# Patient Record
Sex: FEMALE | Race: WHITE | NOT HISPANIC OR LATINO | Employment: OTHER | URBAN - NONMETROPOLITAN AREA
[De-identification: names, ages, dates, MRNs, and addresses within clinical notes are randomized per-mention and may not be internally consistent; named-entity substitution may affect disease eponyms.]

---

## 2021-12-29 ENCOUNTER — APPOINTMENT (OUTPATIENT)
Dept: LAB | Facility: HOSPITAL | Age: 76
End: 2021-12-29
Attending: OTOLARYNGOLOGY
Payer: COMMERCIAL

## 2021-12-29 DIAGNOSIS — R94.4 NONSPECIFIC ABNORMAL RESULTS OF KIDNEY FUNCTION STUDY: ICD-10-CM

## 2021-12-29 LAB
BUN SERPL-MCNC: 16 MG/DL (ref 5–25)
CREAT SERPL-MCNC: 0.79 MG/DL (ref 0.6–1.3)
GFR SERPL CREATININE-BSD FRML MDRD: 73 ML/MIN/1.73SQ M

## 2021-12-29 PROCEDURE — 36415 COLL VENOUS BLD VENIPUNCTURE: CPT

## 2021-12-29 PROCEDURE — 82565 ASSAY OF CREATININE: CPT

## 2021-12-29 PROCEDURE — 84520 ASSAY OF UREA NITROGEN: CPT

## 2022-01-11 ENCOUNTER — HOSPITAL ENCOUNTER (OUTPATIENT)
Dept: MRI IMAGING | Facility: HOSPITAL | Age: 77
Discharge: HOME | End: 2022-01-11
Attending: OTOLARYNGOLOGY
Payer: COMMERCIAL

## 2022-01-11 DIAGNOSIS — H90.3 SENSORINEURAL HEARING LOSS, BILATERAL: ICD-10-CM

## 2022-01-11 PROCEDURE — G1004 CDSM NDSC: HCPCS

## 2022-01-11 PROCEDURE — 70551 MRI BRAIN STEM W/O DYE: CPT

## 2023-05-24 ENCOUNTER — OFFICE VISIT (OUTPATIENT)
Age: 78
End: 2023-05-24

## 2023-05-24 VITALS
DIASTOLIC BLOOD PRESSURE: 80 MMHG | SYSTOLIC BLOOD PRESSURE: 120 MMHG | HEIGHT: 64 IN | BODY MASS INDEX: 23.9 KG/M2 | RESPIRATION RATE: 18 BRPM | HEART RATE: 97 BPM | OXYGEN SATURATION: 98 % | WEIGHT: 140 LBS | TEMPERATURE: 97 F

## 2023-05-24 DIAGNOSIS — L03.114 CELLULITIS OF LEFT UPPER EXTREMITY: ICD-10-CM
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
## 2023-05-24 RX ORDER — AMOXICILLIN AND CLAVULANATE POTASSIUM 875; 125 MG/1; MG/1
1 TABLET, FILM COATED ORAL EVERY 12 HOURS SCHEDULED
Qty: 14 TABLET | Refills: 0 | Status: SHIPPED | OUTPATIENT
Start: 2023-05-24 | End: 2023-05-31

## 2023-05-24 NOTE — PROGRESS NOTES
[facility]        NAME: Krishna Cast is a 78 y o  female  : 1945    MRN: [id number]  DATE: May 24, 2023  TIME: 9:22 AM    Assessment and Plan   Cat bite, initial encounter [[id number]]  1  Cat bite, initial encounter  amoxicillin-clavulanate (AUGMENTIN) 875-125 mg per tablet      2  Cellulitis of left upper extremity  amoxicillin-clavulanate (AUGMENTIN) 875-125 mg per tablet          Information from cat received from [employer], faxed to animal control by nursing staff  Discussed with patient regarding CDC recommendation  States cat was on the way to the shelter- unable to watch for 10 days  States she does not want vaccines  States understanding of recommendation  Patient declined Tdap vaccine (unknown last Tetanus vaccination)  Patient had requested and provided with a sling due to increased pain when she lets her left hand down  Patient Instructions   Start and complete course of antibiotics  If left hand redness and swelling worse after 24 hours of taking antibiotics - go to the ED as this can represent worsening of the cellulitis and/or further complications that could possibly require interventions only available in the hospital     CDC recommends watch the animal for 10 days to monitor for signs and symptoms of rabies  Follow up with PCP in 3-5 days for wound check  Proceed to ER if symptoms worsen or if you decide you want to receive vaccination    Chief Complaint     Chief Complaint   Patient presents with   • Animal Bite     Sisters Cat bit patient in right forearm, left forearm and left thumb yesterday  Left thumb is warm to the touch, red and swollen          History of Present Illness       Bite by sisters cat yesterday  Has redness and swelling to about 33% of left hand around left thumb  It is warm to touch and painful  She states that they were taking the cat to the shelter as her sister was unable to care for the cat anymore  States no fevers or chills        Review of Systems "  Review of Systems   Constitutional: Negative for chills and fever  HENT: Negative for ear pain and sore throat  Eyes: Negative for pain and visual disturbance  Respiratory: Negative for cough and shortness of breath  Cardiovascular: Negative for chest pain and palpitations  Gastrointestinal: Negative for abdominal pain and vomiting  Genitourinary: Negative for dysuria and hematuria  Musculoskeletal: Positive for joint swelling  Negative for arthralgias and back pain  Skin: Positive for wound  Negative for rash  Neurological: Negative for seizures and syncope  All other systems reviewed and are negative  Current Medications       Current Outpatient Medications:   •  amoxicillin-clavulanate (AUGMENTIN) 875-125 mg per tablet, Take 1 tablet by mouth every 12 (twelve) hours for 7 days, Disp: 14 tablet, Rfl: 0    Current Allergies     Allergies as of 05/24/2023   • (No Known Allergies)            The following portions of the patient's history were reviewed and updated as appropriate: allergies, current medications, past family history, past medical history, past social history, past surgical history and problem list      Past Medical History:   Diagnosis Date   • Known health problems: none        Past Surgical History:   Procedure Laterality Date   • NO PAST SURGERIES         History reviewed  No pertinent family history  Medications have been verified  Objective   /80   Pulse 97   Temp (!) 97 °F (36 1 °C) (Tympanic)   Resp 18   Ht 5' 4\" (1 626 m)   Wt 63 5 kg (140 lb)   SpO2 98%   BMI 24 03 kg/m²   No LMP recorded  Patient is postmenopausal        Physical Exam     Physical Exam  Constitutional:       General: She is not in acute distress  Appearance: Normal appearance  She is not ill-appearing or toxic-appearing  HENT:      Head: Normocephalic        Right Ear: Tympanic membrane normal       Left Ear: Tympanic membrane normal       Nose: Nose normal       " Mouth/Throat:      Mouth: Mucous membranes are moist       Pharynx: Oropharynx is clear  Eyes:      Extraocular Movements: Extraocular movements intact  Conjunctiva/sclera: Conjunctivae normal       Pupils: Pupils are equal, round, and reactive to light  Cardiovascular:      Rate and Rhythm: Normal rate  Pulses: Normal pulses  Heart sounds: Normal heart sounds  Pulmonary:      Effort: Pulmonary effort is normal  No respiratory distress  Breath sounds: Normal breath sounds  No stridor  No wheezing, rhonchi or rales  Chest:      Chest wall: No tenderness  Abdominal:      General: Bowel sounds are normal       Palpations: Abdomen is soft  Musculoskeletal:         General: Normal range of motion  Cervical back: Normal range of motion and neck supple  Skin:     General: Skin is warm  Capillary Refill: Capillary refill takes less than 2 seconds  Neurological:      General: No focal deficit present  Mental Status: She is alert and oriented to person, place, and time  Mental status is at baseline

## 2023-05-24 NOTE — PATIENT INSTRUCTIONS
Start and complete course of antibiotics  If left hand redness and swelling worse after 24 hours of taking antibiotics - go to the ED as this can represent worsening of the cellulitis and/or further complications that could possibly require interventions only available in the hospital     CDC recommends watch the animal for 10 days to monitor for signs and symptoms of rabies      Follow up with PCP in 3-5 days for wound check  Proceed to ER if symptoms worsen or if you decide you want to receive vaccination